# Patient Record
Sex: MALE | Race: WHITE | NOT HISPANIC OR LATINO | Employment: OTHER | URBAN - METROPOLITAN AREA
[De-identification: names, ages, dates, MRNs, and addresses within clinical notes are randomized per-mention and may not be internally consistent; named-entity substitution may affect disease eponyms.]

---

## 2024-03-14 ENCOUNTER — OFFICE VISIT (OUTPATIENT)
Dept: CARDIOLOGY | Facility: CLINIC | Age: 69
End: 2024-03-14
Payer: MEDICARE

## 2024-03-14 VITALS
WEIGHT: 212 LBS | HEART RATE: 77 BPM | DIASTOLIC BLOOD PRESSURE: 81 MMHG | OXYGEN SATURATION: 94 % | SYSTOLIC BLOOD PRESSURE: 110 MMHG

## 2024-03-14 DIAGNOSIS — I25.10 ASHD (ARTERIOSCLEROTIC HEART DISEASE): Primary | ICD-10-CM

## 2024-03-14 DIAGNOSIS — Z98.61 S/P PTCA (PERCUTANEOUS TRANSLUMINAL CORONARY ANGIOPLASTY): ICD-10-CM

## 2024-03-14 PROBLEM — E78.00 HYPERCHOLESTEROLEMIA: Status: ACTIVE | Noted: 2024-03-14

## 2024-03-14 PROCEDURE — 1125F AMNT PAIN NOTED PAIN PRSNT: CPT | Performed by: INTERNAL MEDICINE

## 2024-03-14 PROCEDURE — 93005 ELECTROCARDIOGRAM TRACING: CPT | Performed by: INTERNAL MEDICINE

## 2024-03-14 PROCEDURE — 1160F RVW MEDS BY RX/DR IN RCRD: CPT | Performed by: INTERNAL MEDICINE

## 2024-03-14 PROCEDURE — 93010 ELECTROCARDIOGRAM REPORT: CPT | Performed by: INTERNAL MEDICINE

## 2024-03-14 PROCEDURE — 1159F MED LIST DOCD IN RCRD: CPT | Performed by: INTERNAL MEDICINE

## 2024-03-14 PROCEDURE — 99213 OFFICE O/P EST LOW 20 MIN: CPT | Performed by: INTERNAL MEDICINE

## 2024-03-14 PROCEDURE — 99203 OFFICE O/P NEW LOW 30 MIN: CPT | Performed by: INTERNAL MEDICINE

## 2024-03-14 RX ORDER — METFORMIN HYDROCHLORIDE EXTENDED-RELEASE TABLETS 500 MG/1
1000 TABLET, FILM COATED, EXTENDED RELEASE ORAL
COMMUNITY

## 2024-03-14 RX ORDER — PIOGLITAZONEHYDROCHLORIDE 30 MG/1
30 TABLET ORAL DAILY
COMMUNITY

## 2024-03-14 RX ORDER — METOPROLOL TARTRATE 25 MG/1
25 TABLET, FILM COATED ORAL 2 TIMES DAILY
COMMUNITY

## 2024-03-14 RX ORDER — ATORVASTATIN CALCIUM 40 MG/1
40 TABLET, FILM COATED ORAL DAILY
COMMUNITY

## 2024-03-14 RX ORDER — UBIDECARENONE 75 MG
500 CAPSULE ORAL DAILY
COMMUNITY

## 2024-03-14 RX ORDER — ASPIRIN 325 MG
325 TABLET ORAL DAILY
COMMUNITY

## 2024-03-14 RX ORDER — FENOFIBRATE 160 MG/1
160 TABLET ORAL DAILY
COMMUNITY

## 2024-03-14 RX ORDER — LISINOPRIL 10 MG/1
10 TABLET ORAL DAILY
COMMUNITY

## 2024-03-14 ASSESSMENT — PAIN SCALES - GENERAL: PAINLEVEL: 2

## 2024-03-14 ASSESSMENT — PATIENT HEALTH QUESTIONNAIRE - PHQ9
1. LITTLE INTEREST OR PLEASURE IN DOING THINGS: NOT AT ALL
2. FEELING DOWN, DEPRESSED OR HOPELESS: NOT AT ALL
SUM OF ALL RESPONSES TO PHQ9 QUESTIONS 1 AND 2: 0

## 2024-03-14 ASSESSMENT — ENCOUNTER SYMPTOMS
FEVER: 1
GASTROINTESTINAL NEGATIVE: 1
LOSS OF SENSATION IN FEET: 0
OCCASIONAL FEELINGS OF UNSTEADINESS: 0
ABDOMINAL PAIN: 0
EYES NEGATIVE: 1
JOINT SWELLING: 1
COUGH: 0
ENDOCRINE NEGATIVE: 1
FALLS: 0
NEUROLOGICAL NEGATIVE: 1
SHORTNESS OF BREATH: 0
CHILLS: 0
DEPRESSION: 0

## 2024-03-14 NOTE — PROGRESS NOTES
Subjective      Chief Complaint   Patient presents with    LAST SEEN 2018 RE establish          This is a 68-year-old white male we have not seen for quite some time.  He does have a history of having coronary artery disease and had a myocardial infarction in 2009 and had coronary angioplasty at that time.  He does have diabetes and is a smoker.  There is also family's coronary artery disease.  He has remained active he is not complaining of any chest pain chest pressures or discomforts.  Complaint episodes of PND or orthopnea.  His legs not swelling on him.  His last stress test was in 2011.  When he has myocardial infarction he had more pains in his back which she is not having now.  His cholesterol has been maintained by his primary.  He continues to smoke.  He does not complain symptoms of intermittent claudication nor amaurosis fugax.  He has never had rheumatic fever.  His EKG today shows a normal sinus rhythm with remote microinfarction and nonspecific ST-T wave changes.           Review of Systems   Constitutional: Positive for fever. Negative for chills.   HENT: Negative.     Eyes: Negative.    Respiratory:  Negative for cough and shortness of breath.    Endocrine: Negative.    Skin: Negative.    Musculoskeletal:  Positive for joint swelling. Negative for falls and joint pain.   Gastrointestinal: Negative.  Negative for abdominal pain.   Genitourinary: Negative.    Neurological: Negative.    All other systems reviewed and are negative.       Past Surgical History:   Procedure Laterality Date    HERNIA REPAIR          Active Ambulatory Problems     Diagnosis Date Noted    ASHD (arteriosclerotic heart disease) 03/14/2024    S/P PTCA (percutaneous transluminal coronary angioplasty) 03/14/2024    Hypercholesterolemia 03/14/2024     Resolved Ambulatory Problems     Diagnosis Date Noted    No Resolved Ambulatory Problems     Past Medical History:   Diagnosis Date    DM (diabetes mellitus) (CMS/[facility])     History of  "PTCA         Visit Vitals  /81   Pulse 77   Wt 96.2 kg (212 lb)   SpO2 94%   Smoking Status Every Day        Objective     Constitutional:       Appearance: Healthy appearance.   Eyes:      Pupils: Pupils are equal, round, and reactive to light.   Neck:      Vascular: JVD normal.   Pulmonary:      Breath sounds: Normal breath sounds.   Cardiovascular:      PMI at left midclavicular line. Normal rate. Regular rhythm. Normal S1. Normal S2.       Murmurs: There is no murmur.      No gallop.  No click. No rub.   Pulses:     Intact distal pulses.   Edema:     Peripheral edema absent.   Abdominal:      Palpations: Abdomen is soft.      Tenderness: There is no abdominal tenderness.   Musculoskeletal:      Extremities: No clubbing present.Skin:     General: Skin is warm and dry.   Neurological:      General: No focal deficit present.            Lab Review:         No results found for: \"CHOL\"  No results found for: \"HDL\"  No results found for: \"LDLCALC\"  No results found for: \"TRIG\"  No components found for: \"CHOLHDL\"     Assessment/Plan     ASHD (arteriosclerotic heart disease)  This is a 68-year-old white male who does have a history of having myocardial infarction in 2009 and angioplasty at that time.  He continues to smoke.  He is not complaining of any symptoms of angina factors nor congestive heart failure.  He remains active his EKG shows remote inferior wall myocardial infarction.  His cholesterol has been checked by primary and he says he is doing well.  Would continue as is and we will see him back in 6 months.     "

## 2024-03-14 NOTE — ASSESSMENT & PLAN NOTE
This is a 68-year-old white male who does have a history of having myocardial infarction in 2009 and angioplasty at that time.  He continues to smoke.  He is not complaining of any symptoms of angina factors nor congestive heart failure.  He remains active his EKG shows remote inferior wall myocardial infarction.  His cholesterol has been checked by primary and he says he is doing well.  Would continue as is and we will see him back in 6 months.

## 2024-09-18 NOTE — PROGRESS NOTES
Subjective      Chief Complaint   Patient presents with    Follow-up          He does have a history of having coronary artery disease and had a myocardial infarction in 2009 and had coronary angioplasty at that time.  He does have diabetes and is a smoker.  There is also family's coronary artery disease.  He doing well and is active.  He is not complaining of chest discomfort.  NO PND or orthopnea.  The legs are not swelling on him.  He does not complain of palpitations.    Is still smoking and can't seem to quit.  The BP is doing well  The chol is checked by [facility] and is good           Review of Systems   Constitutional: Negative. Negative for chills and fever.   HENT: Negative.     Eyes: Negative.    Respiratory:  Negative for cough and shortness of breath.    Endocrine: Negative.    Skin: Negative.    Musculoskeletal: Negative.  Negative for arthritis, back pain and falls.   Gastrointestinal: Negative.    Genitourinary: Negative.    Neurological: Negative.    All other systems reviewed and are negative.       Past Surgical History:   Procedure Laterality Date    HERNIA REPAIR          Active Ambulatory Problems     Diagnosis Date Noted    ASHD (arteriosclerotic heart disease) 03/14/2024    S/P PTCA (percutaneous transluminal coronary angioplasty) 03/14/2024    Hypercholesterolemia 03/14/2024     Resolved Ambulatory Problems     Diagnosis Date Noted    No Resolved Ambulatory Problems     Past Medical History:   Diagnosis Date    DM (diabetes mellitus) ([facility])     History of PTCA         Visit Vitals  /76   Pulse 76   Wt 92.1 kg (203 lb)   SpO2 94%   Smoking Status Every Day        Objective     Constitutional:       Appearance: Healthy appearance.   Eyes:      Pupils: Pupils are equal, round, and reactive to light.   Neck:      Vascular: No JVR. JVD normal.   Pulmonary:      Effort: Pulmonary effort is normal.      Breath sounds: Normal breath sounds.   Cardiovascular:      PMI at left midclavicular line.  "Normal rate. Regular rhythm. Normal S1. Normal S2.       Murmurs: There is a grade 1/6 holosystolic murmur.      No gallop.  No click. No rub.   Pulses:     Intact distal pulses.   Edema:     Peripheral edema absent.   Abdominal:      Palpations: Abdomen is soft.      Tenderness: There is no abdominal tenderness.   Musculoskeletal: Normal range of motion.      Extremities: No clubbing present.Skin:     General: Skin is warm and dry.   Neurological:      General: No focal deficit present.            Lab Review:         No results found for: \"CHOL\"  No results found for: \"HDL\"  No results found for: \"LDLCALC\"  No results found for: \"TRIG\"  No components found for: \"CHOLHDL\"     Assessment/Plan     ASHD (arteriosclerotic heart disease)  He is doing well and the MI was in 09 and is doing well. No angina and no chf. He continues to smoke and can't seem to stop    Hypercholesterolemia  Has been checked by Dr HINDS and is well controlled     "

## 2024-09-19 ENCOUNTER — OFFICE VISIT (OUTPATIENT)
Dept: CARDIOLOGY | Facility: CLINIC | Age: 69
End: 2024-09-19
Payer: MEDICARE

## 2024-09-19 VITALS
DIASTOLIC BLOOD PRESSURE: 76 MMHG | HEART RATE: 76 BPM | OXYGEN SATURATION: 94 % | SYSTOLIC BLOOD PRESSURE: 126 MMHG | WEIGHT: 203 LBS

## 2024-09-19 DIAGNOSIS — E78.00 HYPERCHOLESTEROLEMIA: ICD-10-CM

## 2024-09-19 DIAGNOSIS — I25.10 ASHD (ARTERIOSCLEROTIC HEART DISEASE): Primary | ICD-10-CM

## 2024-09-19 PROCEDURE — 1126F AMNT PAIN NOTED NONE PRSNT: CPT | Performed by: INTERNAL MEDICINE

## 2024-09-19 PROCEDURE — 99212 OFFICE O/P EST SF 10 MIN: CPT | Performed by: INTERNAL MEDICINE

## 2024-09-19 PROCEDURE — 1159F MED LIST DOCD IN RCRD: CPT | Performed by: INTERNAL MEDICINE

## 2024-09-19 ASSESSMENT — ENCOUNTER SYMPTOMS
CONSTITUTIONAL NEGATIVE: 1
FALLS: 0
GASTROINTESTINAL NEGATIVE: 1
MUSCULOSKELETAL NEGATIVE: 1
BACK PAIN: 0
EYES NEGATIVE: 1
COUGH: 0
ENDOCRINE NEGATIVE: 1
FEVER: 0
SHORTNESS OF BREATH: 0
NEUROLOGICAL NEGATIVE: 1
CHILLS: 0

## 2024-09-19 ASSESSMENT — PATIENT HEALTH QUESTIONNAIRE - PHQ9
2. FEELING DOWN, DEPRESSED OR HOPELESS: NOT AT ALL
1. LITTLE INTEREST OR PLEASURE IN DOING THINGS: NOT AT ALL
SUM OF ALL RESPONSES TO PHQ9 QUESTIONS 1 AND 2: 0

## 2024-09-19 ASSESSMENT — PAIN SCALES - GENERAL: PAINLEVEL: 0-NO PAIN

## 2024-09-19 NOTE — ASSESSMENT & PLAN NOTE
He is doing well and the MI was in 09 and is doing well. No angina and no chf. He continues to smoke and can't seem to stop

## 2024-11-04 ENCOUNTER — HOSPITAL ENCOUNTER (OUTPATIENT)
Dept: RADIOLOGY | Facility: HOSPITAL | Age: 69
Discharge: HOME | End: 2024-11-04
Payer: MEDICARE

## 2024-11-04 ENCOUNTER — APPOINTMENT (OUTPATIENT)
Dept: RADIOLOGY | Facility: HOSPITAL | Age: 69
End: 2024-11-04
Payer: MEDICARE

## 2024-11-04 DIAGNOSIS — R20.0 ANESTHESIA OF SKIN: ICD-10-CM

## 2024-11-04 PROCEDURE — 70450 CT HEAD/BRAIN W/O DYE: CPT

## 2024-11-04 PROCEDURE — 70450 CT HEAD/BRAIN W/O DYE: CPT | Performed by: RADIOLOGY

## 2024-11-27 ENCOUNTER — HOSPITAL ENCOUNTER (OUTPATIENT)
Dept: VASCULAR MEDICINE | Facility: HOSPITAL | Age: 69
Discharge: HOME | End: 2024-11-27
Payer: MEDICARE

## 2024-11-27 DIAGNOSIS — G45.3 AMAUROSIS FUGAX: ICD-10-CM

## 2024-11-27 DIAGNOSIS — G45.9 TRANSIENT CEREBRAL ISCHEMIC ATTACK, UNSPECIFIED: ICD-10-CM

## 2024-11-27 PROCEDURE — 93880 EXTRACRANIAL BILAT STUDY: CPT

## 2024-11-27 PROCEDURE — 93880 EXTRACRANIAL BILAT STUDY: CPT | Performed by: SURGERY

## 2024-12-05 ENCOUNTER — HOSPITAL ENCOUNTER (OUTPATIENT)
Dept: RADIOLOGY | Facility: HOSPITAL | Age: 69
Discharge: HOME | End: 2024-12-05
Payer: MEDICARE

## 2024-12-05 DIAGNOSIS — G45.9 TRANSIENT CEREBRAL ISCHEMIC ATTACK, UNSPECIFIED: ICD-10-CM

## 2024-12-05 PROCEDURE — 70551 MRI BRAIN STEM W/O DYE: CPT | Performed by: RADIOLOGY

## 2024-12-05 PROCEDURE — 70551 MRI BRAIN STEM W/O DYE: CPT

## 2025-03-19 ENCOUNTER — APPOINTMENT (OUTPATIENT)
Dept: CARDIOLOGY | Facility: CLINIC | Age: 70
End: 2025-03-19
Payer: MEDICARE

## 2025-03-19 ENCOUNTER — OFFICE VISIT (OUTPATIENT)
Facility: CLINIC | Age: 70
End: 2025-03-19
Payer: MEDICARE

## 2025-03-19 VITALS
DIASTOLIC BLOOD PRESSURE: 78 MMHG | OXYGEN SATURATION: 94 % | WEIGHT: 207 LBS | HEART RATE: 78 BPM | SYSTOLIC BLOOD PRESSURE: 118 MMHG

## 2025-03-19 DIAGNOSIS — I25.10 CORONARY ARTERY DISEASE INVOLVING NATIVE CORONARY ARTERY OF NATIVE HEART WITHOUT ANGINA PECTORIS: Primary | ICD-10-CM

## 2025-03-19 PROCEDURE — 1126F AMNT PAIN NOTED NONE PRSNT: CPT | Performed by: INTERNAL MEDICINE

## 2025-03-19 PROCEDURE — 99214 OFFICE O/P EST MOD 30 MIN: CPT | Performed by: INTERNAL MEDICINE

## 2025-03-19 PROCEDURE — 1159F MED LIST DOCD IN RCRD: CPT | Performed by: INTERNAL MEDICINE

## 2025-03-19 ASSESSMENT — LIFESTYLE VARIABLES: TOTAL SCORE: 0

## 2025-03-19 ASSESSMENT — PAIN SCALES - GENERAL: PAINLEVEL_OUTOF10: 0-NO PAIN

## 2025-03-19 ASSESSMENT — ENCOUNTER SYMPTOMS
DEPRESSION: 0
OCCASIONAL FEELINGS OF UNSTEADINESS: 0
LOSS OF SENSATION IN FEET: 0

## 2025-03-19 ASSESSMENT — PATIENT HEALTH QUESTIONNAIRE - PHQ9
SUM OF ALL RESPONSES TO PHQ9 QUESTIONS 1 AND 2: 0
1. LITTLE INTEREST OR PLEASURE IN DOING THINGS: NOT AT ALL
2. FEELING DOWN, DEPRESSED OR HOPELESS: NOT AT ALL

## 2025-03-19 NOTE — ASSESSMENT & PLAN NOTE
History of myocardial infarction with coronary stenting 2009 and he remains angina free on aspirin therapy and high-dose statin therapy

## 2025-03-19 NOTE — PROGRESS NOTES
Subjective      Chief Complaint   Patient presents with    6 month follow up        For routine follow-up visit and angina free with no signs of volume retention.    Previous:He does have a history of having coronary artery disease and had a myocardial infarction in 2009 and had coronary angioplasty at that time.  He does have diabetes and is a smoker.  There is also family's coronary artery disease.  He doing well and is active.  He is not complaining of chest discomfort.           Review of Systems   All other systems reviewed and are negative.       Objective   Physical Exam  Constitutional:       Appearance: Normal appearance.   HENT:      Head: Normocephalic and atraumatic.   Eyes:      Pupils: Pupils are equal, round, and reactive to light.   Cardiovascular:      Rate and Rhythm: Normal rate and regular rhythm.      Pulses: Normal pulses.      Heart sounds: Normal heart sounds.   Pulmonary:      Effort: Pulmonary effort is normal.      Breath sounds: Normal breath sounds.   Abdominal:      General: Abdomen is flat. Bowel sounds are normal.      Palpations: Abdomen is soft.   Musculoskeletal:         General: Normal range of motion.      Cervical back: Normal range of motion.   Skin:     General: Skin is warm and dry.   Neurological:      General: No focal deficit present.   Psychiatric:         Mood and Affect: Mood normal.         Judgment: Judgment normal.          Lab Review:   Not applicable    No problem-specific Assessment & Plan notes found for this encounter.

## 2025-03-19 NOTE — ASSESSMENT & PLAN NOTE
He needs annual lipid and metabolic profiles in the presence of Lipitor and fenofibrate.  He is having this lab work with his primary care physician and can follow-up in our office in 1 year or sooner for active cardiac issues